# Patient Record
Sex: MALE | Race: WHITE | NOT HISPANIC OR LATINO | URBAN - METROPOLITAN AREA
[De-identification: names, ages, dates, MRNs, and addresses within clinical notes are randomized per-mention and may not be internally consistent; named-entity substitution may affect disease eponyms.]

---

## 2020-11-25 ENCOUNTER — EMERGENCY (EMERGENCY)
Facility: HOSPITAL | Age: 30
LOS: 1 days | Discharge: HOME | End: 2020-11-25
Admitting: EMERGENCY MEDICINE
Payer: COMMERCIAL

## 2020-11-25 VITALS
HEART RATE: 78 BPM | DIASTOLIC BLOOD PRESSURE: 73 MMHG | OXYGEN SATURATION: 98 % | TEMPERATURE: 98 F | SYSTOLIC BLOOD PRESSURE: 132 MMHG | RESPIRATION RATE: 18 BRPM

## 2020-11-25 DIAGNOSIS — Z20.828 CONTACT WITH AND (SUSPECTED) EXPOSURE TO OTHER VIRAL COMMUNICABLE DISEASES: ICD-10-CM

## 2020-11-25 PROCEDURE — 99283 EMERGENCY DEPT VISIT LOW MDM: CPT

## 2020-11-25 NOTE — ED PROVIDER NOTE - OBJECTIVE STATEMENT
29 y/o male presents to the ED requesting COVID-19 testing. Patient is currently asymptomatic and has no other medical complaints at this time. +Post-exposure on Saturday. Denies fever, chills, chest pain, SOB, cough.

## 2020-11-25 NOTE — ED PROVIDER NOTE - NSFOLLOWUPINSTRUCTIONS_ED_ALL_ED_FT
THE COVID 19 TEST RESULTS  - results may take up to 2-3 days to become available   - if you have consented, you will receive your results electronically   -  you can check Donay DIVYA or call [phone] to discuss your results with our nursing staff    Please continue to self quarantine (home isolation) until your result is back and follow instructions accordingly  - positive: complete home isolation for a total of 14 days since day of testing and no more fever with feeling back to baseline   - negative: you will be able to stop home isolation but still practice standard precautions, similar to how you would manage a regular flu/cold.    Return to ER for any worsening symptoms, such as persistent fever >100.4F, shortness of breath, coughing up bloody sputum, chest pain, lethargy, and fainting    Please remember to wash your hands frequently (>20 seconds each time), avoid touching your face, and cover your cough/sneeze.  Always wear a mask when you are outside of your home and practice social distancing.    Only take Tylenol for fever/pain control and avoid NSAIDs (ibuprofen/Advil/Aleve/naproxen) due to potential increased risk of exacerbating COVID-19 infection

## 2020-11-25 NOTE — ED PROVIDER NOTE - PATIENT PORTAL LINK FT
You can access the FollowMyHealth Patient Portal offered by [facility] by registering at the following website: [email or web address]. By joining [employer]’s FollowMyHealth portal, you will also be able to view your health information using other applications (apps) compatible with our system.

## 2020-12-14 ENCOUNTER — EMERGENCY (EMERGENCY)
Facility: HOSPITAL | Age: 30
LOS: 1 days | Discharge: HOME | End: 2020-12-14
Attending: EMERGENCY MEDICINE | Admitting: EMERGENCY MEDICINE
Payer: COMMERCIAL

## 2020-12-14 VITALS
SYSTOLIC BLOOD PRESSURE: 130 MMHG | DIASTOLIC BLOOD PRESSURE: 79 MMHG | OXYGEN SATURATION: 96 % | RESPIRATION RATE: 18 BRPM | TEMPERATURE: 98 F | HEART RATE: 81 BPM

## 2020-12-14 DIAGNOSIS — Z20.828 CONTACT WITH AND (SUSPECTED) EXPOSURE TO OTHER VIRAL COMMUNICABLE DISEASES: ICD-10-CM

## 2020-12-14 PROCEDURE — 99283 EMERGENCY DEPT VISIT LOW MDM: CPT

## 2020-12-14 NOTE — ED PROVIDER NOTE - OBJECTIVE STATEMENT
29 y/o M presents to the ED requesting to have testing done for COVID-19 after being around someone who was non-complaint with mask wearing. Pt endorses he is asymptomatic at this time. Pt denies fevers, chills, coughs, CP, SOB, recent travels.

## 2020-12-14 NOTE — ED PROVIDER NOTE - CLINICAL SUMMARY MEDICAL DECISION MAKING FREE TEXT BOX
31 y/o M presents to the ED requesting to have screening testing done for COVID-19. Pt endorses he is asymptomatic at this time. On exam, Pt appears well and in no apparent distress. No vital sign derangements. No conversational dyspnea. Nasopharyngeal PCR has been obtained and Pt has been guided on how to obtain their results. General COVID-19 discharge instructions have been given and Pt agrees to receiving results electronically.

## 2020-12-14 NOTE — ED PROVIDER NOTE - PATIENT PORTAL LINK FT
You can access the FollowMyHealth Patient Portal offered by [facility] by registering at the following website: [email or web address]. By joining [employer]’s FollowMyHealth portal, you will also be able to view your health information using other applications (apps) compatible with our system.

## 2021-01-06 ENCOUNTER — EMERGENCY (EMERGENCY)
Facility: HOSPITAL | Age: 31
LOS: 1 days | Discharge: HOME | End: 2021-01-06
Attending: EMERGENCY MEDICINE | Admitting: EMERGENCY MEDICINE
Payer: COMMERCIAL

## 2021-01-06 VITALS
DIASTOLIC BLOOD PRESSURE: 78 MMHG | OXYGEN SATURATION: 99 % | TEMPERATURE: 98 F | RESPIRATION RATE: 17 BRPM | HEART RATE: 79 BPM | SYSTOLIC BLOOD PRESSURE: 136 MMHG

## 2021-01-06 DIAGNOSIS — Z20.822 CONTACT WITH AND (SUSPECTED) EXPOSURE TO COVID-19: ICD-10-CM

## 2021-01-06 PROBLEM — Z78.9 OTHER SPECIFIED HEALTH STATUS: Chronic | Status: ACTIVE | Noted: 2020-12-14

## 2021-01-06 PROCEDURE — 99283 EMERGENCY DEPT VISIT LOW MDM: CPT

## 2021-01-06 NOTE — ED PROVIDER NOTE - OBJECTIVE STATEMENT
Pt presents to the ED requesting testing for COVID-19. Asymptomatic at this time with no other acute medical complaints currently. No fever, chills, cough, CP, SOB Pt presents to the ED requesting testing for COVID-19. Asymptomatic at this time with no other acute medical complaints currently. No fever, chills, cough, CP, SOB. Pt was in contact with someone 11 days ago who tested positive for COVID. Pt also plans to travel to [place] in a few days

## 2021-01-06 NOTE — ED PROVIDER NOTE - PATIENT PORTAL LINK FT
You can access the FollowMyHealth Patient Portal offered by [facility] by registering at the following website: [email or web address]. By joining [employer]’s FollowMyHealth portal, you will also be able to view your health information using other applications (apps) compatible with our system.

## 2021-01-06 NOTE — ED PROVIDER NOTE - NSFOLLOWUPINSTRUCTIONS_ED_ALL_ED_FT
Your test results may take 1-3 days. You will get a text/email.  Please check the patient online portal ([facility] and website) for results. You can create a portal account at [email or web address]. [facility] [place]. If you have old records with [facility] or [facility]  or encounter any difficulties with us you will need to call the HELP line to merge results [phone] (Mon-Fri 8a-5p).    Please follow the instructions on provided coronavirus discharge educational forms and if needed self quarantine for 14 days.     If you test positive for COVID 19:    1. STAY HOME for 14 DAYS  2. Minimize human contact to ONLY ESSENTIAL  3. Every time you wash your hands, sing the HAPPY BIRTHDAY song so you know you're washing long enough.  Make sure to scrub the webspace between your fingers.  4. DRINK 1-3 Liters of fluids day x at least 5 days.  To remain hydrated. Your fatigue, lightheadedness, and body aches will decrease and your fever has a better chance of breaking if you are well hydrated.    5. For your Fever and Body aches takes Tylenol 650-100mg every 4-6h (max 4000mg/day). Try not to use ibuprofen, aspirin or naproxen (Advil, Motrin or Aleve) as these may worsen Coronavirus infection.  6. Use an inhaler for mild shortness of breath and cough  7. RETURN TO THE ER IMMEDIATELY IF YOU HAVE WORSENING SHORTNESS OF BREATH  8. TAKE THE FOLLOWING SUPPLEMENTS DAILY.        VITAMIN C 1000MG ONCE DAILY.        VITAMIN D 200IU ONCE DAILY.        ZINC 50MG ONCE DAILY.

## 2021-02-03 ENCOUNTER — EMERGENCY (EMERGENCY)
Facility: HOSPITAL | Age: 31
LOS: 1 days | Discharge: HOME | End: 2021-02-03
Attending: EMERGENCY MEDICINE | Admitting: EMERGENCY MEDICINE
Payer: COMMERCIAL

## 2021-02-03 VITALS
SYSTOLIC BLOOD PRESSURE: 117 MMHG | OXYGEN SATURATION: 97 % | TEMPERATURE: 98 F | HEART RATE: 79 BPM | DIASTOLIC BLOOD PRESSURE: 86 MMHG | RESPIRATION RATE: 17 BRPM

## 2021-02-03 DIAGNOSIS — Z20.822 CONTACT WITH AND (SUSPECTED) EXPOSURE TO COVID-19: ICD-10-CM

## 2021-02-03 PROCEDURE — 99282 EMERGENCY DEPT VISIT SF MDM: CPT

## 2021-02-03 NOTE — ED PROVIDER NOTE - NSFOLLOWUPINSTRUCTIONS_ED_ALL_ED_FT
Your test results may take 1-3 days. You will get a text/email.  Please check the patient online portal ([facility] and website) for results. You can create a portal account at [email or web address]. [facility] [place]. If you have old records with [employer] or [facility]  or encounter any difficulties with us you will need to call the HELP line to merge results [phone] (Mon-Fri 8a-5p).    Please follow the instructions on provided coronavirus discharge educational forms and if needed self quarantine for 14 days.     If you test positive for COVID 19:    1. STAY HOME for 14 DAYS  2. Minimize Human contact to ONLY ESSENTIAL  3. Every time you wash your hands, sing the HAPPY BIRTHDAY Song so you know you're washing long enough.  Make sure to scrub the webspace between your fingers.  4. DRINK 1-3 Liters of fluids day x at least 5 days.  To remain hydrated. Your fatigue, lightheadedness, and body aches will decrease and your fever has a better chance of breaking if you are well hydrated.    5. For your Fever and Body aches takes Tylenol 650-100mg every 4-6h (max 4000mg/day). Try not to use ibuprofen, aspirin or naproxen (Advil, Motrin or Aleve) as these may worsen Coronavirus infection.  6. Use an inhaler for mild shortness of breath and cough  7. RETURN TO THE ER IMMEDIATELY IF YOU HAVE WORSENING SHORTNESS OF BREATH  8. TAKE THE FOLLOWING SUPPLEMENTS DAILY.        VITAMIN C 1000MG ONCE DAILY.        VITAMIN D 200IU ONCE DAILY.        ZINC 50MG ONCE DAILY.

## 2021-02-03 NOTE — ED PROVIDER NOTE - PATIENT PORTAL LINK FT
You can access the FollowMyHealth Patient Portal offered by [facility] by registering at the following website: [email or web address]. By joining [employer]’s FollowMyHealth portal, you will also be able to view your health information using other applications (apps) compatible with our system.

## 2021-02-04 LAB — SARS-COV-2 RNA SPEC QL NAA+PROBE: SIGNIFICANT CHANGE UP

## 2021-02-25 ENCOUNTER — EMERGENCY (EMERGENCY)
Facility: HOSPITAL | Age: 31
LOS: 1 days | Discharge: HOME | End: 2021-02-25
Attending: EMERGENCY MEDICINE | Admitting: EMERGENCY MEDICINE
Payer: COMMERCIAL

## 2021-02-25 VITALS
RESPIRATION RATE: 12 BRPM | HEART RATE: 67 BPM | TEMPERATURE: 98 F | OXYGEN SATURATION: 98 % | DIASTOLIC BLOOD PRESSURE: 71 MMHG | SYSTOLIC BLOOD PRESSURE: 114 MMHG

## 2021-02-25 DIAGNOSIS — Z20.822 CONTACT WITH AND (SUSPECTED) EXPOSURE TO COVID-19: ICD-10-CM

## 2021-02-25 PROCEDURE — 99282 EMERGENCY DEPT VISIT SF MDM: CPT

## 2021-02-25 NOTE — ED ADULT TRIAGE NOTE - NS ED NURSE DISCH DISPOSITION
----- Message from Lisseth Campo sent at 3/3/2017 11:10 AM CST -----  Contact: RHONDA WHITE [[id number]]  _x  1st Request  _  2nd Request  _  3rd Request        Who: RHONDA WHITE [[id number]]    Why: patient states she has a UTI and is currently out of town and would like a Rx sent to [facility] in [place], [place] [phone] . Patient states there pharmacy closes at 7 pm today    What Number to Call Back: [phone]    When to Expect a call back: (Before the end of the day)   -- if call after 3:00 call back will be tomorrow.   Discharged

## 2021-02-25 NOTE — ED PROVIDER NOTE - PATIENT PORTAL LINK FT
You can access the FollowMyHealth Patient Portal offered by  by registering at the following website: [email or web address]. By joining [employer]’s FollowMyHealth portal, you will also be able to view your health information using other applications (apps) compatible with our system.

## 2021-02-25 NOTE — ED PROVIDER NOTE - NSFOLLOWUPINSTRUCTIONS_ED_ALL_ED_FT
IF YOU DO NOT GET YOUR RESULTS WITHIN 48 HOURS PLEASE CALL [phone].    IF YOUR RESULT COMES BACK POSITIVE:    1. STAY HOME for 14 DAYS  2. Minimize Human contact to ONLY ESSENTIAL  3. Every time you wash your hands, sing the HAPPY BIRTHDAY Song so you know you're washing long enough.  Make sure to scrub the webspace between your fingers.  4. DRINK 1-3 Liters of fluids day x at least 5 days.  To remain hydrated. Your fatigue, lightheadedness, and body aches will decrease and your fever has a better chance of breaking if you are well hydrated.    5. For your Fever and Body aches takes Tylenol 650-100mg every 4-6h (max 4000mg/day). Try not to use ibuprofen, aspirin or naproxen (Advil, Motrin or Aleve) as these may worsen Coronavirus infection.  6. RETURN TO THE ER IMMEDIATELY IF YOU HAVE WORSENING SHORTNESS OF BREATH. SYMPTOMS USUALLY PEAK BETWEEN DAY 7-10.

## 2021-02-25 NOTE — ED PROVIDER NOTE - OBJECTIVE STATEMENT
Pt presents to the ED requesting to have screening testing done for COVID-19. Pt reports asymptomatic at this time. Pt denies fevers, chills, coughs, CP, SOB, recent travels.  Reports recent exposure to someone with Covid 19.

## 2021-02-26 LAB — SARS-COV-2 RNA SPEC QL NAA+PROBE: SIGNIFICANT CHANGE UP

## 2023-04-14 NOTE — ED PROVIDER NOTE - NS ED ATTENDING STATEMENT MOD
----- Message from Kia Garcia MD sent at 4/13/2023 11:16 PM CDT -----  Hi, can you please call patient to inform of biopsy shows benign mole consistent with an intradermal nevus as discussed an incidental cyst. NFTN           Attending Only

## 2024-03-12 ENCOUNTER — RESULT REVIEW (OUTPATIENT)
Age: 34
End: 2024-03-12

## 2024-03-21 ENCOUNTER — TRANSCRIPTION ENCOUNTER (OUTPATIENT)
Age: 34
End: 2024-03-21

## 2024-03-21 PROBLEM — Z00.00 ENCOUNTER FOR PREVENTIVE HEALTH EXAMINATION: Status: ACTIVE | Noted: 2024-03-21

## 2024-04-02 PROBLEM — I60.8: Status: ACTIVE | Noted: 2024-04-02

## 2024-04-03 NOTE — REVIEW OF SYSTEMS
[Decr. Concentrating Ability] : decreased concentrating ability [As Noted in HPI] : as noted in HPI [de-identified] : Intermittent posterior headaches

## 2024-04-03 NOTE — HISTORY OF PRESENT ILLNESS
[FreeTextEntry1] : CORTEZ SAINI is a 33 year-old male with a PMHx significant for ***  who presents to Dr. Springer office today for f/u evaluation of SAH           PCP:

## 2024-04-03 NOTE — PROCEDURE
[FreeTextEntry1] : Cerebral and cervical angiogram (3/14/2024) INTERPRETATION: Surgeon: Dr. Jordana Springer  Preoperative diagnosis: Subarachnoid hemorrhage Postoperative diagnosis: No angiographic evidence of vascular anomaly  Clinical history: 38-year-old male was admitted for headaches and initial non-contrast head CT demonstrated subarachnoid hemorrhage. Initial imaging demonstrated no evidence of vascular anomaly. The patient comes today for repeat cerebral angiogram to further evaluate.  Estimated blood loss: 10 cc  Vessel selected and studied:  Right common carotid artery Right internal carotid artery Left common carotid artery Left internal carotid artery Left vertebral artery  3-D rotational angiography was performed via injections of the following vessels: Left vertebral artery  Images were post processed using shaded surface reconstruction. Images were extensively analyzed in post processed by the interpreting physician on a separate workstation. 3-D rotational angiography was performed to better evaluate the anatomy of the cerebral vasculature in question.  Complications: No complications encountered during or immediately following the procedure.  Description of the procedure: The right groin was prepped and draped using a sterile technique. The subcutaneous tissues overlying the right common femoral artery were locally anesthetized with lidocaine. Utilizing a standard arterial micropuncture kit with ultrasound guidance, the right common femoral artery was accessed and a 5 Sudanese 11 cm sheath was placed. Utilizing a 0.035 angled Glidewire, selective arterial catheterizations were performed as detailed above. Nonionic contrast was utilized throughout the procedure for the patient's safety. At the end of the procedure, catheters and sheath were removed from hemostasis in the right groin obtained by placement of a 5 Sudanese ExoSeal arteriotomy closure device. T device was deployed without complications. No complications were encountered during or immediately following the procedure.  Findings of the cerebral angiogram: Injections of the right common femoral artery demonstrated patent right common femoral artery and branches. The catheter was advanced into the right common carotid artery. Injections of the right common carotid artery demonstrate patency of the right carotid bifurcation. The catheter was advanced into the right internal carotid artery. Injections of the right internal carotid artery demonstrate an unremarkable appearance of the cerebral vasculature with no evidence of cerebral aneurysm, vascular malformation with a vascular anomaly. Arterial, parenchymal and venous phase of the angiogram appear normal. The catheter was advanced into the left common carotid artery. Injections of the left common carotid artery demonstrate patency of the left carotid bifurcation. The catheter was advanced into the left internal carotid artery. Injections of the left internal carotid artery demonstrate an unremarkable appearance of the cerebral vasculature with no evidence of cerebral aneurysm, vascular malformation with a vascular anomaly. Arterial, parenchymal and venous phase of the angiogram appear normal. The catheter was advanced into the left vertebral artery. Injections of the left vertebral artery demonstrate an unremarkable appearance of the cerebral vasculature with no evidence of cerebral aneurysm or other vascular anomaly. Arterial, parenchymal and venous phase of the angiogram appear normal. 3-D rotational angiography images better depict these findings.  Summary of the procedure: No evidence of cerebral aneurysm, vascular malformation with a vascular anomaly to explain the patient's hemorrhage. No complications.

## 2024-04-03 NOTE — DATA REVIEWED
[de-identified] : CT BRAIN ORDERED BY: JACKIE SOLOMON  PROCEDURE DATE: 03/16/2024    INTERPRETATION: PROCEDURE: CT head without intravenous contrast  INDICATION: New visual disturbance  TECHNIQUE: Serial axial images were obtained from the skull base to the vertex without the use of intravenous contrast. Coronal and sagittal reconstructions were created.  PRIOR STUDIES: CT head dated 3/11/2024  FINDINGS: VENTRICLES AND SULCI: The ventricles and sulci are normal in appearance and commensurate with the patient's age. No hydrocephalus. Interval resolution of trace intraventricular hemorrhage. EXTRA-AXIAL: Near complete interval resolution of subarachnoid hemorrhage with a small focus of hyperdensity along the falx possibly representing residual blood products. No new extra-axial hemorrhage. INTRA-AXIAL: No space-occupying intraparenchymal mass, hemorrhage, or midline shift is present. No acute transcortical infarction identified. VISUALIZED ORBITS: Within normal limits. VISUALIZED SINUSES: No air-fluid levels. VISUALIZED MASTOIDS: Well aerated. CALVARIUM: No fracture.  IMPRESSION: Near complete interval resolution of subarachnoid hemorrhage. No new intracranial hemorrhage.  [de-identified] : CT ANGIO BRAIN (W)AW IC ORDERED BY: JACKIE SOLOMON  PROCEDURE DATE: 03/16/2024    INTERPRETATION: PROCEDURE: CTA brain with intravenous contrast.  INDICATION: New visual disturbance in the setting of subarachnoid hemorrhage.  TECHNIQUE: Multiple axial thin section were obtained through the [place] following the intravenous bolus injection of Omnipaque 350. Image postprocessing was performed including the production coronal and sagittal maximum intensity projections (MIPs).  80 cc of Isovue-370 was administered intravenously without reported complication. 20 cc was discarded.  COMPARISON: CTA head and neck dated 3/10/2024  FINDINGS: The internal carotid arteries are patent at the skull base and intracranial compartment without occlusion or high grade stenosis. The anterior and middle cerebral arteries are patent at their 1st and 2nd order segments, and appear symmetric in caliber. The posterior circulation shows no high grade stenosis or occlusion. The intracranial vertebral arteries, the basilar artery and both posterior cerebral arteries are patent. There is no site of aneurysm within the resolution limitations of CTA.  IMPRESSION: No large vessel occlusion or high-grade stenosis. [de-identified] : MR SPINE CERVICAL WAW IC ORDERED BY: DEANNE AGUIRRE  ACC: [id number] EXAM: MR BRAIN WAW IC ORDERED BY: DEANNE AGUIRRE  PROCEDURE DATE: 03/16/2024    INTERPRETATION: Two examinations were performed in this patient: 1. MR of the brain with and without gadolinium contrast 2. MR cervical spine with and without gadolinium contrast   CLINICAL INFORMATION: SAH SAH [facility]  TECHNIQUE: MR brain: Sagittal and axial T1-weighted images, axial FLAIR images, axial susceptibility weighted images, axial T2-weighted images and axial diffusion weighted images of the brain were obtained. Following gadolinium administration axial volumetric T1 weighted images were obtained. This data set was reconstructed as sagittal and coronal images. Subsequent axial T1-weighted acquisition was obtained. MR cervical spine: Sagittal T2-weighted, T1-weighted and STIR images were obtained. Axial T2-weighted, T1-weighted and T2-weighted gradient echo images were obtained. Following gadolinium administration sagittal and axial fat-saturated T1-weighted images were obtained. CONTRAST: 8ml cc administered ; 2ml cc discarded  COMPARISON: CT head most recent 3/11/2024  FINDINGS:  BRAIN  BRAIN and CSF SPACES: The brain remains significant for subarachnoid space hemorrhage. This is evident within the dependent sulci of the cerebral hemispheres and within the basal cisterns. Within brain parenchyma a solitary small focal indistinct lesion is found within the left posterior frontal subcortical white matter. This lesion is hyperintense on the long TR images, otherwise inconspicuous. No brain parenchymal hemorrhage is found. No cerebral cortical lesion is identified No abnormal enhancement is found in the brain. No diffusion restriction is found in the brain. No acute cerebral cortical infarct is found. No mass effect is found in the brain.  The ventricles, sulci and basal cisterns are not dilated.  VESSELS: The vertebral and internal carotid arteries demonstrate expected flow voids indicating their patency. The left vertebral artery is dominant caliber. The principal dural sinuses enhance consistent with her patency.  HEAD AND NECK STRUCTURES: The orbits are unremarkable. Paranasal sinuses are clear. The nasal cavity appears intact. The central skull base appears intact. The nasopharynx is symmetric. The temporal bones appear clear of disease. The calvarium appears unremarkable.  CERVICAL SPINE  Cervical vertebral alignment demonstrates shallow reversal of the cervical lordosis. This vertebral malalignment has an apex at C6. Facet joints appear aligned. Cervical vertebral body heights are maintained. Cervical vertebral marrow signal intensity Is intact. No pathologic vertebral enhancement is found. No osseous expansion or epidural disease is found.. No destructive bone lesion is found.  Cervical intervertebral disc spaces demonstrate mild peribronchial degenerative signal intensity loss on the long TR images greatest in the upper cervical levels. Degenerative disc height loss is greatest at C5-C6. At this level broad shallow irregular left central disc protrusion minimally deforms the left ventral cord surface, absent cord signal intensity change. At C6-C7 shallow right central disc protrusion is associated with mild deformity of the right ventral cord surface, also absent cord signal intensity change. At the remaining cervical intervertebral disc levels. No degenerative high-grade central canal compromise is recognized. Neural foramina appear intact. No abnormal disc space enhancement is recognized.  Cervical cord morphology demonstrates direct compromise by extrinsic disc material as described above. The cervical cord maintains intact signal intensity No focal intrinsic cord lesion is identified. No cord expansion or cord volume loss is recognized. No abnormal enhancement occurs within the canal. Indistinct T1 hyperintensities noted ventral and dorsal to the mid cervical cord consistent with extension of the intracranial subarachnoid space hemorrhage.  The visualized adjacent neck structures appear intact. No neck mass is recognized. Paraspinal soft tissues appear intact. Visualized lymph nodes appear to be within physiologic size limits.   IMPRESSION:  1. BRAIN: Subarachnoid space hemorrhage. Left posterior frontal solitary subcortical white matter lesion is nonspecific minimal disease.. No abnormal enhancement  2. CERVICAL SPINE: Mild to moderate mid cervical degenerative disc disease includes C5-C6 broad shallow irregular left central and C6-C7 shallow right central disc protrusions (disc herniations) that cause ventral cord deformity. Small foci of suspected extension of subarachnoid space hemorrhage  [de-identified] :  CT BRAIN PERFUSION MAPS STROKE ORDERED BY: JACKIE SOLOMON  PROCEDURE DATE: 03/16/2024    INTERPRETATION: PROCEDURE: CT Perfusion with intravenous contrast.  INDICATION: New visual disturbance in the setting of subarachnoid hemorrhage  TECHNIQUE: Following the intravenous administration of 40 ml of contrast, serial axial images were obtained through the brain. The CT perfusion data set was post processed per [facility] protocol generating color maps of CBF, CBV, MTT, and Tmax.  COMPARISON: None  FINDINGS: The CT perfusion study demonstrates no perfusion abnormality. There is no mismatch volume.  CBF less than 30% volume: 0 mL. Tmax greater than 6 seconds: 0 mL. Mismatch volume: 0 mL. Mismatch ratio: None  IMPRESSION: Negative CT perfusion study.

## 2024-04-03 NOTE — ASSESSMENT
[FreeTextEntry1] : CORTEZ SAINI is a 33 year-old male who presents for***       PLAN: - f/u with Dr. Smart - MRI and MRA in 6 months (Aug 2024)   The patient was instructed that if they should immediately call 911 or go to the Emergency Department if they experience symptoms of severe thunderclap headache, syncope, unexplained nausea/vomiting, visual changes, seizure-like activity, new weakness or numbness of extremities.   Patient verbalizes agreement and understanding with plan of care.  I, Dr. Springer, personally performed the evaluation and management (E/M) services for this established patient who presents today with (a) new problem(s)/exacerbation of (an) existing condition(s). That E/M includes conducting the examination, assessing all new/exacerbated conditions, and establishing a new plan of care. Today, FRANK Leonard, was here to observe my evaluation and management services for this new problem/exacerbated condition to be followed going forward.

## 2024-04-04 ENCOUNTER — APPOINTMENT (OUTPATIENT)
Age: 34
End: 2024-04-04

## 2024-04-04 DIAGNOSIS — I60.8 OTHER NONTRAUMATIC SUBARACHNOID HEMORRHAGE: ICD-10-CM
